# Patient Record
Sex: MALE | Race: BLACK OR AFRICAN AMERICAN | URBAN - METROPOLITAN AREA
[De-identification: names, ages, dates, MRNs, and addresses within clinical notes are randomized per-mention and may not be internally consistent; named-entity substitution may affect disease eponyms.]

---

## 2017-08-30 ENCOUNTER — HISTORICAL (OUTPATIENT)
Dept: ADMINISTRATIVE | Facility: HOSPITAL | Age: 27
End: 2017-08-30

## 2017-09-04 LAB — FINAL CULTURE: NORMAL

## 2017-09-05 LAB — GRAM STN SPEC: NORMAL

## 2019-05-25 ENCOUNTER — HISTORICAL (OUTPATIENT)
Dept: ADMINISTRATIVE | Facility: HOSPITAL | Age: 29
End: 2019-05-25

## 2019-05-30 ENCOUNTER — HISTORICAL (OUTPATIENT)
Dept: ADMINISTRATIVE | Facility: HOSPITAL | Age: 29
End: 2019-05-30

## 2019-05-30 LAB
ABS NEUT (OLG): 5.03 X10(3)/MCL (ref 2.1–9.2)
BASOPHILS # BLD AUTO: 0.04 X10(3)/MCL (ref 0–0.2)
BASOPHILS NFR BLD AUTO: 0.5 % (ref 0–0.9)
BUN SERPL-MCNC: 13 MG/DL (ref 7–18)
CALCIUM SERPL-MCNC: 9.3 MG/DL (ref 8.5–10.1)
CHLORIDE SERPL-SCNC: 107 MMOL/L (ref 98–107)
CO2 SERPL-SCNC: 26 MMOL/L (ref 21–32)
CREAT SERPL-MCNC: 1.29 MG/DL (ref 0.7–1.3)
CREAT/UREA NIT SERPL: 10
EOSINOPHIL # BLD AUTO: 0.11 X10(3)/MCL (ref 0–0.9)
EOSINOPHIL NFR BLD AUTO: 1.3 % (ref 0–6.5)
ERYTHROCYTE [DISTWIDTH] IN BLOOD BY AUTOMATED COUNT: 14.5 % (ref 11.5–17)
GLUCOSE SERPL-MCNC: 88 MG/DL (ref 74–106)
HCT VFR BLD AUTO: 44.1 % (ref 42–52)
HGB BLD-MCNC: 13.9 GM/DL (ref 14–18)
HYPOCHROMIA BLD QL SMEAR: ABNORMAL
IMM GRANULOCYTES # BLD AUTO: 0.01 10*3/UL (ref 0–0.02)
IMM GRANULOCYTES NFR BLD AUTO: 0.1 % (ref 0–0.43)
LYMPHOCYTES # BLD AUTO: 2.53 X10(3)/MCL (ref 0.6–4.6)
LYMPHOCYTES NFR BLD AUTO: 30.4 % (ref 16.2–38.3)
MCH RBC QN AUTO: 23.2 PG (ref 27–31)
MCHC RBC AUTO-ENTMCNC: 31.5 GM/DL (ref 33–36)
MCV RBC AUTO: 73.6 FL (ref 80–94)
MICROCYTES BLD QL SMEAR: ABNORMAL
MONOCYTES # BLD AUTO: 0.59 X10(3)/MCL (ref 0.1–1.3)
MONOCYTES NFR BLD AUTO: 7.1 % (ref 4.7–11.3)
NEUTROPHILS # BLD AUTO: 5.03 X10(3)/MCL (ref 2.1–9.2)
NEUTROPHILS NFR BLD AUTO: 60.6 % (ref 49.1–73.4)
NRBC BLD AUTO-RTO: 0 % (ref 0–0.2)
OVALOCYTES BLD QL SMEAR: SLIGHT
PLATELET # BLD AUTO: 234 X10(3)/MCL (ref 130–400)
PLATELET # BLD EST: ADEQUATE 10*3/UL
PMV BLD AUTO: 10 FL (ref 7.4–10.4)
POIKILOCYTOSIS BLD QL SMEAR: SLIGHT
POTASSIUM SERPL-SCNC: 3.9 MMOL/L (ref 3.5–5.1)
RBC # BLD AUTO: 5.99 X10(6)/MCL (ref 4.7–6.1)
RBC MORPH BLD: ABNORMAL
SODIUM SERPL-SCNC: 139 MMOL/L (ref 136–145)
WBC # SPEC AUTO: 8.3 X10(3)/MCL (ref 4.5–11.5)

## 2022-04-07 ENCOUNTER — HISTORICAL (OUTPATIENT)
Dept: ADMINISTRATIVE | Facility: HOSPITAL | Age: 32
End: 2022-04-07

## 2022-04-23 VITALS
SYSTOLIC BLOOD PRESSURE: 137 MMHG | WEIGHT: 231.5 LBS | BODY MASS INDEX: 30.68 KG/M2 | DIASTOLIC BLOOD PRESSURE: 66 MMHG | HEIGHT: 73 IN

## 2022-04-30 NOTE — OP NOTE
DATE OF SURGERY:    05/31/2019    SURGEON:  Nestor Joseph MD    PREOPERATIVE DIAGNOSIS:  Complete rupture of left Achilles tendon.    POSTOPERATIVE DIAGNOSIS:  Complete rupture of left Achilles tendon.    PROCEDURE:  Left Achilles tendon repair.    ANESTHESIA:  LMA.    IV FLUIDS:  As per Anesthesia.    ESTIMATED BLOOD LOSS:  Less than 10 cc.    COUNTS:  Correct.    COMPLICATIONS:  None.    DISPOSITION:  Stable to PACU.    INDICATIONS FOR PROCEDURE:  Mr. Kerr is a 28-year-old male, who felt a pop in his left heel.  He had difficulty with weightbearing as well as pushing off.  The patient had an MRI demonstrating the above findings.  The risks, benefits and alternatives were discussed with the patient in detail.  The risks included, but were not limited to, pain, bleeding, infection, damage to blood vessels or nerves, heart attack, stroke, death, need for operation, continued pain, continued loss of motion, rerupture, wound healing complications, and need for additional surgery.  The patient understood these risks and wanted to proceed with surgical intervention.  Informed consent was obtained.    PROCEDURE IN DETAIL:  The patient was found in preoperative holding by Anesthesia and found fit for surgery.  He was taken to the operating room and placed on the operating table in supine position all.  Bony prominences were well padded.  Timeout was done to identify the correct patient, correct procedure and correct site, and all were in agreement.  The patient underwent LMA anesthesia without complications.  He was then prepped and draped in a normal sterile fashion leaving the left lower extremity exposed for surgery.  He was in the prone position.  Next, after exsanguination of the left lower extremity, tourniquet was inflated to 300, approximate tourniquet time was 35 minutes.     Next, attention turned to the posterior aspect of the left heel.  Over the palpable defect, a 10 cm incision was made.  Soft  tissue dissection was carried down, careful not to damage the neurovascular tendinous structures.  The peritenon was opened.     Next, examination of the Achilles tendon.  The patient had a complete rupture of the Achilles tendon.  He had separation of approximately 3 cm.  The wound was then copiously irrigated.  Next, with the use of #2 FiberWire modified Chong sutures, the patient had 4 strands, both in the proximal and distal segment.  Next, the tendon was reapproximated, careful not to over or under tension his repair and tied together.  He also had 2-0 Vicryl sutures holding onto small strands.  Next, after complete repair of his Achilles tendon, the patient had good connection without over tensioning.       After this was done, tourniquet was released, hemostasis achieved, copious irrigation was used to wash the wound.  The peritenon was placed back over the tendon with 3-0 Vicryl.  Subcutaneous tissue was closed 3-0 Vicryl.  Skin was closed with 3-0 nylon suture in a standard fashion.  Xeroform, 4 x 4's, and a well-padded posterior splint placed in slight equinus was placed to the left lower extremity.  He was then awoken by Anesthesia and brought to PACU in stable condition.        ______________________________  MD ANJALI Kee/SC  DD:  06/10/2019  Time:  07:43AM  DT:  06/10/2019  Time:  09:14AM  Job #:  [id number]

## 2022-04-30 NOTE — ED PROVIDER NOTES
Patient:   Leonel Kerr            MRN: [id number]            FIN: [id number]               Age:   27 years     Sex:  Male     :  1990   Associated Diagnoses:   Acute kidney injury; Acute viral pharyngitis   Author:   Danyell Kingston      Basic Information   Time seen: Date & time 2017 17:16:00, Immediately upon arrival.   History source: Patient.   Arrival mode: Private vehicle.   History limitation: None.   Additional information: Chief Complaint from Nursing Triage Note : Chief Complaint   2017 17:11 CDT      Chief Complaint           pt here for ST, fever and neck stiffness since Monday  .      History of Present Illness   The patient presents with sore throat.  The onset was 2  days ago.  The course/duration of symptoms is constant.  Location: Bilateral throat. The character of symptoms is pain.  The degree at present is moderate.  The exacerbating factor is swallowing.  The relieving factor is none.  Risk factors consist of none.  Prior episodes: none.  Therapy today: see nurses notes.  Associated symptoms: fever, chills, difficulty swallowing, headache, denies nausea, denies vomiting, denies cough, denies rhinorrhea, denies nasal congestion, denies dyspnea, denies change in voice, denies jaw pain, denies ear pain, denies dental pain, denies facial pain, denies foreign body sensation and denies rash.  Additional history:     Patient presents today c/o sore throat that started 2 days ago. Associated symptoms include subjective fever, chills, odynophagia, generalized headache, neck pain/stiffness, and myalgias. He does endorse photophobia associated with the headache.  Denies change in voice, otalgia, cough, sinus congestion, rash, dizziness, vision changes, eye pain, phonophobia, ams, syncope, neck injury/trauma, nausea/vomiting, focal weakness, chest pain, sob, sick contacts..        Review of Systems   Constitutional symptoms:  Fever, chills, No weakness,    Skin  symptoms:  No rash,    Eye symptoms:  Vision unchanged.   ENMT symptoms:  Sore throat, no ear pain, no nasal congestion, no sinus pain.    Respiratory symptoms:  No shortness of breath, no cough.    Cardiovascular symptoms:  No chest pain,    Gastrointestinal symptoms:  No abdominal pain, no nausea, no vomiting.    Genitourinary symptoms:  Negative except as documented in HPI.   Musculoskeletal symptoms:  Joint pain.   Neurologic symptoms:  Headache, No dizziness,              Additional review of systems information: All other systems reviewed and otherwise negative.      Health Status   Allergies:    Allergic Reactions (Selected)  No Known Allergies,    Allergies (1) Active Reaction  No Known Allergies None Documented  .   Medications:  (Selected)   Inpatient Medications  Ordered  Toradol for IM: 60 mg, form: Injection, IM, Once-NOW, first dose 08/30/17 17:16:00 CDT, stop date 08/30/17 17:16:00 CDT, [phone], per nurse's notes.   Immunizations: Per nurse's notes.      Past Medical/ Family/ Social History   Medical history:    No active or resolved past medical history items have been selected or recorded., Reviewed as documented in chart.   Surgical history:    No active procedure history items have been selected or recorded., Reviewed as documented in chart.   Family history:    No family history items have been selected or recorded., Reviewed as documented in chart.   Social history: Reviewed as documented in chart, Alcohol use: Denies, Tobacco use: Denies, Drug use: Denies, Occupation: Unemployed, Family/social situation: Intact family.      Physical Examination               Vital Signs             Time:  8/30/2017 17:16:00.   Vital Signs   8/30/2017 17:11 CDT      Temperature Oral          38.1 DegC  HI                             Peripheral Pulse Rate     89 bpm                             Respiratory Rate          20 br/min                             SpO2                      99 %                              Oxygen Therapy            Room air                             Systolic Blood Pressure   125 mmHg                             Diastolic Blood Pressure  63 mmHg  .      Vital Signs (last 24 hrs)_____  Last Charted___________  Temp Oral     H 38.1DegC  (AUG 30 17:11)  Heart Rate Peripheral   89 bpm  (AUG 30 17:11)  Resp Rate         20 br/min  (AUG 30 17:11)  SBP      125 mmHg  (AUG 30 17:11)  DBP      63 mmHg  (AUG 30 17:11)  SpO2      99 %  (AUG 30 17:11)  .   Basic Oxygen Information   8/30/2017 17:11 CDT      SpO2                      99 %                             Oxygen Therapy            Room air  .   General:  Alert, no acute distress.    Skin:  Warm, dry, intact, normal for ethnicity.    Head:  Normocephalic, atraumatic.    Neck:  Supple, trachea midline, no tenderness, no JVD, No cervical vertebral point tenderness. Decreased ROM due to pain. Negative Kernig and Brudzinski sign. .    Eye:  Pupils are equal, round and reactive to light, extraocular movements are intact, normal conjunctiva.    Ears, nose, mouth and throat:  Oral mucosa moist, Throat: Bilateral, mild, tonsil, erythema, swelling, gag reflex present, 2+ tonsillar hypertrophy, not with exudate, no palatal petechiae, no uvula shift.    Cardiovascular:  Regular rate and rhythm, No murmur, Normal peripheral perfusion, No edema.    Respiratory:  Lungs are clear to auscultation, respirations are non-labored, breath sounds are equal, Symmetrical chest wall expansion.    Gastrointestinal:  Soft, Nontender, Non distended, Normal bowel sounds.    Back:  Normal range of motion, Normal alignment.    Neurological:  Alert and oriented to person, place, time, and situation, No focal neurological deficit observed, CN II-XII intact, normal sensory observed, normal motor observed, normal speech observed, normal coordination observed.    Lymphatics:  No lymphadenopathy.      Medical Decision Making   Differential Diagnosis:  Viral pharyngitis, streptococcal  pharyngitis, exudative pharyngitis.    Rationale:  Patient with fever, neck stiffness, and headache. LP will be done to rule out meningitis. There are no focal neurologic deficits or signs of increased intracranial pressure..   Documents reviewed:  Emergency department nurses' notes, emergency department records, prior records.    Results review:     No qualifying data available, Rapid strep - negative.       Procedure   Lumbar Puncture   Time: 8/30/2017 19:13:00 .    Confirmed: Patient, procedure, and site correct, Time-out taken prior to procedure.    Consent: Patient, Has given verbal consent, Has signed consent.    Indication: Possible meningitis.    Pre procedure exam: Circulation, motor, and sensory intact.    Procedural sedation: See nurse's notes.    Monitoring: Cardiac, blood pressure, continuous pulse oximetry, See nurse's notes.    Patient position: Sitting bending forward.    Location: L4-L5.    Preparation: Local anesthesia: 5  ml 1% lidocaine injected locally.    Technique: 19 gauge spinal needle inserted, Fluid return: clear, Specimen sent to lab.    Post procedure exam: Circulation, motor, sensory examination intact, Bleeding controlled.    Patient tolerated: Well.    Complications: None.    Performed by: Resident.    Total time: 25 minutes.    Lumbar puncture performed. See seperate procedure note.       Impression and Plan   Diagnosis   Acute kidney injury ([id number] N17.9)   Acute viral pharyngitis ([id number] J02.8)   Plan   Condition: Stable.    Disposition: Discharged: Time  8/30/2017 22:15:00, to home, Patient care transitioned to: Time: 8/30/2017 23:00:00, Rosie VELASCO, Stefan NICOLAS    Patient was given the following educational materials: Lumbar Puncture, Care After, Acute Kidney Injury, Pharyngitis, Easy-to-Read, Pharyngitis, Easy-to-Read, Acute Kidney Injury, Lumbar Puncture, Care After.    Follow up with: No No PCP; Report to ED if symptoms return / worsen; [facility] Within  2 to 4 weeks.    Counseled: Patient, Regarding diagnosis, Regarding diagnostic results, Regarding treatment plan, Patient indicated understanding of instructions.       Addendum      Teaching-Supervisory Addendum-Brief   I participated in the following activities of this patients care: the medical history, the physical exam, medical decision making, the procedure.   I personally performed: supervision of the patient's care, the medical history, the medical decision making.   The case was discussed with: the resident, the physician assistant.   Procedures: I directly assisted in the performance of the procedure.   Evaluation and management service: I agree with the evaluation and management decisions made in this patient's care.   Results interpretation: I agree with the documentation of the study interpretation.

## 2022-04-30 NOTE — ED PROVIDER NOTES
Patient:   Leonel Kerr            MRN: [id number]            FIN: [id number]               Age:   27 years     Sex:  Male     :  1990   Associated Diagnoses:   None   Author:   Abe Moore MD      Procedure   Lumbar Puncture   Time: 2017 18:20:00 .    Confirmed: Patient, procedure, and site correct.    Consent: Patient.    Indication: Headache, Possible meningitis.    Pre procedure exam: Circulation, motor, and sensory intact.    Procedural sedation: None.    Monitoring: Cardiac, blood pressure, continuous pulse oximetry.    Patient position: Sitting bending forward.    Location: L4-L5.    Preparation   Technique: Fluid return: clear.    Post procedure exam: Circulation, motor, sensory examination intact.    Patient tolerated: Well.    Complications: None.    Performed by: Self.    Total time: 20 minutes.